# Patient Record
Sex: FEMALE | URBAN - METROPOLITAN AREA
[De-identification: names, ages, dates, MRNs, and addresses within clinical notes are randomized per-mention and may not be internally consistent; named-entity substitution may affect disease eponyms.]

---

## 2021-08-17 ENCOUNTER — OFFICE VISIT (OUTPATIENT)
Dept: URBAN - METROPOLITAN AREA CLINIC 89 | Facility: CLINIC | Age: 86
End: 2021-08-17
Payer: MEDICARE

## 2021-08-17 DIAGNOSIS — H31.092 OTHER CHORIORETINAL SCAR OF LEFT EYE: ICD-10-CM

## 2021-08-17 DIAGNOSIS — H26.493 OTHER SECONDARY CATARACT, BILATERAL: ICD-10-CM

## 2021-08-17 DIAGNOSIS — H40.023 OPEN ANGLE WITH BORDERLINE FINDINGS, HIGH RISK, BILATERAL: ICD-10-CM

## 2021-08-17 DIAGNOSIS — Z96.1 PRESENCE OF INTRAOCULAR LENS: ICD-10-CM

## 2021-08-17 DIAGNOSIS — H52.4 PRESBYOPIA: ICD-10-CM

## 2021-08-17 DIAGNOSIS — H35.373 PUCKERING OF MACULA, BILATERAL: Primary | ICD-10-CM

## 2021-08-17 DIAGNOSIS — E05.00 THYROTOXICOSIS WITH DIFFUSE GOITER WITHOUT THYROTOXIC CRISIS OR STORM: ICD-10-CM

## 2021-08-17 PROCEDURE — 92014 COMPRE OPH EXAM EST PT 1/>: CPT | Performed by: OPHTHALMOLOGY

## 2021-08-17 PROCEDURE — 92134 CPTRZ OPH DX IMG PST SGM RTA: CPT | Performed by: OPHTHALMOLOGY

## 2021-08-17 ASSESSMENT — INTRAOCULAR PRESSURE
OS: 16
OD: 15

## 2021-08-17 NOTE — IMPRESSION/PLAN
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 Plan: No need for treatment. Monitor.

## 2021-08-17 NOTE — IMPRESSION/PLAN
Impression: Open angle with borderline findings, high risk, bilateral: H40.023. Plan: RTC for baseline 24-2 HVF,PACH, gonio and OCT of RNFL .

## 2021-08-17 NOTE — IMPRESSION/PLAN
Impression: Diagnosis: Thyrotoxicosis with diffuse goiter without thyrotoxic crisis or storm. Code: E05.00. Plan: Longstandin GABINO- RT proptosis. Needs baseline Michaela exophthalmometer. Will monitor ON status closely for any potential compression. RTC for baseline VF Artificial tears recommended to reduce symptoms of dryness.

## 2021-08-17 NOTE — IMPRESSION/PLAN
Impression: Diagnosis: Puckering of macula, bilateral. Code: Y68.791. Plan: A detailed explanation of the condition was provided to the patient. Monitor at this time as surgery is not recommended. Patient knows to return to clinic if vision begins to decrease prior to their next scheduled examination.

## 2024-03-18 ENCOUNTER — OFFICE VISIT (OUTPATIENT)
Facility: CLINIC | Age: 89
End: 2024-03-18

## 2024-03-18 DIAGNOSIS — E11.9 TYPE 2 DIABETES MELLITUS WITHOUT COMPLICATION, WITHOUT LONG-TERM CURRENT USE OF INSULIN (HCC): ICD-10-CM

## 2024-03-18 DIAGNOSIS — N39.0 URINARY TRACT INFECTION WITHOUT HEMATURIA, SITE UNSPECIFIED: Primary | ICD-10-CM

## 2024-03-18 DIAGNOSIS — J44.9 CHRONIC OBSTRUCTIVE PULMONARY DISEASE, UNSPECIFIED COPD TYPE (HCC): ICD-10-CM

## 2024-03-18 DIAGNOSIS — I50.9 CONGESTIVE HEART FAILURE, UNSPECIFIED HF CHRONICITY, UNSPECIFIED HEART FAILURE TYPE (HCC): ICD-10-CM

## 2024-03-18 PROCEDURE — 99309 SBSQ NF CARE MODERATE MDM 30: CPT

## 2024-03-18 PROCEDURE — 1123F ACP DISCUSS/DSCN MKR DOCD: CPT

## 2024-03-18 NOTE — PROGRESS NOTES
PLACE OF SERVICE:  [facility] [address] [place] [address]    SKILLED VISIT      Chief Complaint:  Follow up on UTI , Acuity rounding .    HPI  Provider following up with patient on her UTI status and rehab progress.  Patient is 89-year-old female who was admitted after her dizziness with recurrent syncopal episodes.,  Ambulatory dysfunction with a fall.  Overall functional decline.  She continues to make poor progress with therapy notes reviewed, no falls reported in house no new events reported in-house.  She is currently getting treated for her urinary tract infection with ESBL in urine.  Last stable 1200 ice 3/26/2024.  She remains afebrile no complaints of dysuria urinary retention no suprapubic pain no flank pain reported.  Vital signs remained stable.  Per staff her overall function is declining.  Her appetite has been decreasing. STAFF encouraged PO fluids, follow up labs ordered to monitor kidney functions and electrolytes . She has history of CHF that is currently well compensated no orthopnea no PND no shortness of breath reported.She is currently on Coreg and Bumex.  History of COPD lungs clear bilaterally no wheezing noted no shortness of breath oxygen Saturation 97% room air she is on albuterol inhaler and ipratropium. history of diabetes blood sugar,Trends reviewed remained stable she is currently on Novolin 5 units in the evening no signs and symptoms of hypoglycemia noted.Abdomen soft nondistended nontender bowel sounds are present no complaints of nausea vomiting reported.  No other acute events reported , No new falls reported. she remains afebrile no fever no temperature reported vital signs remained stable we will continue to monitor patient.    PMH:   Coronary artery disease, CABG, CHF, hyperlipidemia, CKD, chronic anemia, neuropathy, GERD, hypertension, COPD, diabetes      ROS:   The following system review was negative:  Constitutional; Respiratory;